# Patient Record
Sex: MALE | Race: WHITE | NOT HISPANIC OR LATINO | URBAN - METROPOLITAN AREA
[De-identification: names, ages, dates, MRNs, and addresses within clinical notes are randomized per-mention and may not be internally consistent; named-entity substitution may affect disease eponyms.]

---

## 2022-12-15 ENCOUNTER — HOSPITAL ENCOUNTER (EMERGENCY)
Facility: HOSPITAL | Age: 22
Discharge: HOME | End: 2022-12-15
Attending: STUDENT IN AN ORGANIZED HEALTH CARE EDUCATION/TRAINING PROGRAM
Payer: COMMERCIAL

## 2022-12-15 VITALS
OXYGEN SATURATION: 98 % | WEIGHT: 150 LBS | SYSTOLIC BLOOD PRESSURE: 132 MMHG | TEMPERATURE: 99 F | HEART RATE: 117 BPM | BODY MASS INDEX: 21.47 KG/M2 | RESPIRATION RATE: 16 BRPM | DIASTOLIC BLOOD PRESSURE: 80 MMHG | HEIGHT: 70 IN

## 2022-12-15 DIAGNOSIS — S01.511A LIP LACERATION, INITIAL ENCOUNTER: Primary | ICD-10-CM
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
## 2022-12-15 PROCEDURE — 12011 RPR F/E/E/N/L/M 2.5 CM/<: CPT

## 2022-12-15 PROCEDURE — 99284 EMERGENCY DEPT VISIT MOD MDM: CPT | Mod: 25

## 2022-12-15 RX ORDER — AMOXICILLIN AND CLAVULANATE POTASSIUM 875; 125 MG/1; MG/1
1 TABLET, FILM COATED ORAL 2 TIMES DAILY
Qty: 14 TABLET | Refills: 0 | OUTPATIENT
Start: 2022-12-15 | End: 2022-12-15 | Stop reason: SDUPTHER

## 2022-12-15 RX ORDER — CHLORHEXIDINE GLUCONATE ORAL RINSE 1.2 MG/ML
15 SOLUTION DENTAL 3 TIMES DAILY
Qty: 473 ML | Refills: 0 | OUTPATIENT
Start: 2022-12-15 | End: 2022-12-15 | Stop reason: SDUPTHER

## 2022-12-15 RX ORDER — AMOXICILLIN AND CLAVULANATE POTASSIUM 875; 125 MG/1; MG/1
1 TABLET, FILM COATED ORAL 2 TIMES DAILY
Qty: 14 TABLET | Refills: 0 | Status: SHIPPED | OUTPATIENT
Start: 2022-12-15 | End: 2022-12-22

## 2022-12-15 RX ORDER — CHLORHEXIDINE GLUCONATE ORAL RINSE 1.2 MG/ML
15 SOLUTION DENTAL 3 TIMES DAILY
Qty: 473 ML | Refills: 0 | Status: SHIPPED | OUTPATIENT
Start: 2022-12-15 | End: 2022-12-29

## 2022-12-15 RX ORDER — LIDOCAINE HYDROCHLORIDE 10 MG/ML
INJECTION INFILTRATION; PERINEURAL
Status: DISCONTINUED
Start: 2022-12-15 | End: 2022-12-15 | Stop reason: HOSPADM

## 2022-12-15 RX ORDER — LIDOCAINE HCL/EPINEPHRINE/PF 2%-1:200K
10 VIAL (ML) INJECTION ONCE
Status: DISCONTINUED | OUTPATIENT
Start: 2022-12-15 | End: 2022-12-15 | Stop reason: HOSPADM

## 2022-12-15 NOTE — DISCHARGE INSTRUCTIONS
Your stitches will dissolve in 7 days.     Use Peridex rinses as prescribed.     Return to the emergency department if you have any fever, worsening pain, swelling, nausea, vomiting, or any other symptoms.

## 2022-12-15 NOTE — ED PROVIDER NOTES
Encounter Date: 12/15/2022    SCRIBE #1 NOTE: I, Concepcion Jaime, am scribing for, and in the presence of,  Dedrick Oconnor MD.. I have scribed the following portions of the note - Other sections scribed: HPI, ROS, PE.   SCRIBE #2 NOTE: I, Salomon Alexis, am scribing for, and in the presence of,  Dedrick Oconnor MD.. I have scribed the following portions of the note - Other sections scribed: HPI, ROS, PE.   History     Chief Complaint   Patient presents with    Fall     Fell down 3-4 stairs 0030 tonight. Tdap utd per pt. Laceration to inside of bottom lip. (+) ETOH     22 year old male with hx of ADHD presents to the ED for a fall down 3 or 4 steps earlier tonight. He reports his top and bottom lip are swollen and bleeding. Pt denies loss of consciousness, head trauma, or injury to any other part of his body.    The history is provided by the patient. No  was used.   Fall  The accident occurred today. The fall occurred while walking. Distance fallen: 3 to 4 steps. The point of impact was the face. The pain is present in the face. He was Ambulatory at the scene. Pertinent negatives include no fever, no abdominal pain and no loss of consciousness. He has tried nothing for the symptoms.   Review of patient's allergies indicates:  No Known Allergies  History reviewed. No pertinent past medical history.  History reviewed. No pertinent surgical history.  History reviewed. No pertinent family history.     Review of Systems   Constitutional:  Negative for fever.   HENT:  Positive for facial swelling. Negative for sore throat.    Eyes:  Negative for visual disturbance.   Respiratory:  Negative for shortness of breath.    Cardiovascular:  Negative for chest pain.   Gastrointestinal:  Negative for abdominal pain.   Genitourinary:  Negative for dysuria.   Musculoskeletal:  Negative for joint swelling.   Skin:  Positive for wound. Negative for rash.   Neurological:  Negative for loss of consciousness and  weakness.   Psychiatric/Behavioral:  Negative for confusion.    All other systems reviewed and are negative.    Physical Exam     Initial Vitals [12/15/22 0201]   BP Pulse Resp Temp SpO2   132/80 (!) 117 16 99.1 °F (37.3 °C) 98 %      MAP       --         Physical Exam    Nursing note and vitals reviewed.  Constitutional: He appears well-developed and well-nourished. He is not diaphoretic. No distress.   HENT:   Head: Normocephalic and atraumatic.   No abrasions, contusions, lacerations to the scalp or face.  No superior inferior orbital ridge tenderness to palpation.  No zygomatic arch tenderness to palpation.  No epistaxis.  No CSF rhinorrhea.  No septal hematoma.  Abrasion to left upper lip.  Abrasion to chin.  On cm laceration to the inner lower mucosa of lower lip.  Normal external ear.  No raccoon eyes.  No Hammond sign.     Eyes: Conjunctivae and EOM are normal. Pupils are equal, round, and reactive to light.   Neck:   Normal range of motion.  Cardiovascular:  Normal rate, regular rhythm, normal heart sounds and intact distal pulses.           No murmur heard.  Pulmonary/Chest: Breath sounds normal. No respiratory distress. He has no wheezes. He has no rales.   Abdominal: Abdomen is soft. He exhibits no distension. There is no abdominal tenderness.   Musculoskeletal:         General: No tenderness or edema. Normal range of motion.      Cervical back: Normal range of motion.      Comments: No C,T or L-spine vertebral point tenderness to palpation, no step-offs, no deformities.  Right upper extremity:  Full range of motion of shoulder, elbow, wrist, no deformity or tenderness to palpation.  Left upper extremity: Full range of motion of shoulder, elbow, wrist, no deformity or tenderness to palpation.  Right lower extremity:  Full range of motion of hip, knee, ankle, no tenderness palpation or deformity noted.  Left lower extremity:  Full range of motion of hip, knee, ankle, no tenderness palpation or deformity  noted.       Neurological: He is alert and oriented to person, place, and time. No cranial nerve deficit.   Skin: Skin is warm and dry. Capillary refill takes less than 2 seconds. Abrasion (Left lip; Chin) and laceration (1 cm laceration inner lower mucosa of lower lip) noted. No rash noted. No erythema.   Psychiatric: He has a normal mood and affect.       ED Course   Lac Repair    Date/Time: 12/15/2022 2:51 AM  Performed by: Dedrick Oconnor MD  Authorized by: Dedrick Oconnor MD     Consent:     Consent obtained:  Verbal    Consent given by:  Patient    Risks discussed:  Need for additional repair, nerve damage, infection, poor cosmetic result, pain, tendon damage, retained foreign body, vascular damage and poor wound healing    Alternatives discussed:  No treatment  Universal protocol:     Patient identity confirmed:  Arm band and verbally with patient  Anesthesia:     Anesthesia method:  Local infiltration    Local anesthetic:  Lidocaine 1% w/o epi  Laceration details:     Location:  Lip    Lip location:  Lower interior lip    Length (cm):  1    Depth (mm):  5  Pre-procedure details:     Preparation:  Patient was prepped and draped in usual sterile fashion and imaging obtained to evaluate for foreign bodies  Exploration:     Hemostasis achieved with:  Direct pressure    Wound exploration: wound explored through full range of motion and entire depth of wound visualized      Wound extent: no areolar tissue violation noted, no fascia violation noted, no foreign bodies/material noted, no muscle damage noted, no nerve damage noted, no tendon damage noted, no underlying fracture noted and no vascular damage noted    Treatment:     Area cleansed with:  Saline    Amount of cleaning:  Standard    Irrigation solution:  Sterile saline    Irrigation volume:  100    Irrigation method:  Pressure wash  Skin repair:     Repair method:  Sutures    Suture size:  3-0    Suture material:  Chromic gut    Suture technique:  Simple  interrupted    Number of sutures:  2  Approximation:     Approximation:  Close  Repair type:     Repair type:  Simple  Post-procedure details:     Procedure completion:  Tolerated well, no immediate complications  Labs Reviewed - No data to display       Imaging Results    None          Medications - No data to display    Medical Decision Making:   ED Management:  Patient is a 22-year-old male presents to emergency department for laceration to the inner lower lip.  See HPI.  See physical exam.  Repaired.  Will start patient on Peridex rinses.  Awake alert GCS 15, there is no evidence of trauma to the scalp or other facial bones.  Discussed need for follow-up.  Discussed return precautions.  Discussed return if any headache, nausea, vomiting, new pain or swelling, or any other symptoms. Reassessed patient.  Patient is resting comfortably.  Discussed all results.  Discussed need for follow-up.  Discussed return precautions.  Answered all questions at this time.  Hemodynamically stable for continued outpatient management with strict return precautions.  Patient and family verbalized understanding agreed to plan.          Scribe Attestation:   Scribe #1: I performed the above scribed service and the documentation accurately describes the services I performed. I attest to the accuracy of the note.  Scribe #2: I performed the above scribed service and the documentation accurately describes the services I performed. I attest to the accuracy of the note.    Attending Attestation:           Physician Attestation for Scribe:  Physician Attestation Statement for Scribe #1: I, Dedrick Oconnor MD., reviewed documentation, as scribed by Concepcion Jaime in my presence, and it is both accurate and complete.   Physician Attestation Statement for Scribe #2: I, Dedrick Oconnor MD., reviewed documentation, as scribed by Aime Espinoza in my presence, and it is both accurate and complete. I also acknowledge and confirm the content of  the note done by Scribe #1.                     Clinical Impression:   Final diagnoses:  [S01.511A] Lip laceration, initial encounter (Primary)  [W19.XXXA] Fall, initial encounter        ED Disposition Condition    Discharge Stable          ED Prescriptions       Medication Sig Dispense Start Date End Date Auth. Provider    chlorhexidine (PERIDEX) 0.12 % solution  (Status: Discontinued) Use as directed 15 mLs in the mouth or throat 3 (three) times daily. for 14 days 473 mL 12/15/2022 12/15/2022 Dedrick Oconnor MD    amoxicillin-clavulanate 875-125mg (AUGMENTIN) 875-125 mg per tablet  (Status: Discontinued) Take 1 tablet by mouth 2 (two) times daily. for 7 days 14 tablet 12/15/2022 12/15/2022 Dedrick Oconnor MD    amoxicillin-clavulanate 875-125mg (AUGMENTIN) 875-125 mg per tablet () Take 1 tablet by mouth 2 (two) times daily. for 7 days 14 tablet 12/15/2022 2022 Dedrick Oconnor MD    chlorhexidine (PERIDEX) 0.12 % solution Use as directed 15 mLs in the mouth or throat 3 (three) times daily. for 14 days 473 mL 12/15/2022 2022 Dedrick Oconnor MD          Follow-up Information    None          Dedrick Oconnor MD  [id number]

## 2024-08-06 ENCOUNTER — HOSPITAL ENCOUNTER (OUTPATIENT)
Dept: RADIOLOGY | Facility: HOSPITAL | Age: 24
Discharge: HOME | End: 2024-08-06
Payer: COMMERCIAL

## 2024-08-06 DIAGNOSIS — R05.1 ACUTE COUGH: ICD-10-CM

## 2024-08-06 DIAGNOSIS — R05.1 ACUTE COUGH: Primary | ICD-10-CM

## 2024-08-06 PROCEDURE — 71046 X-RAY EXAM CHEST 2 VIEWS: CPT | Mod: TC,PO

## 2024-08-06 PROCEDURE — 71046 X-RAY EXAM CHEST 2 VIEWS: CPT | Mod: 26,,, | Performed by: RADIOLOGY
